# Patient Record
Sex: FEMALE | Race: WHITE | Employment: PART TIME | URBAN - METROPOLITAN AREA
[De-identification: names, ages, dates, MRNs, and addresses within clinical notes are randomized per-mention and may not be internally consistent; named-entity substitution may affect disease eponyms.]

---

## 2019-06-22 ENCOUNTER — HOSPITAL ENCOUNTER (OUTPATIENT)
Age: 19
Discharge: HOME | End: 2019-06-22
Attending: FAMILY MEDICINE
Payer: COMMERCIAL

## 2019-06-22 VITALS
SYSTOLIC BLOOD PRESSURE: 137 MMHG | DIASTOLIC BLOOD PRESSURE: 88 MMHG | HEART RATE: 101 BPM | RESPIRATION RATE: 20 BRPM | WEIGHT: 110 LBS | OXYGEN SATURATION: 98 % | TEMPERATURE: 99 F

## 2019-06-22 DIAGNOSIS — N30.01 ACUTE CYSTITIS WITH HEMATURIA: Primary | ICD-10-CM

## 2019-06-22 PROCEDURE — 99204 OFFICE O/P NEW MOD 45 MIN: CPT

## 2019-06-22 PROCEDURE — 81002 URINALYSIS NONAUTO W/O SCOPE: CPT | Performed by: FAMILY MEDICINE

## 2019-06-22 PROCEDURE — 87088 URINE BACTERIA CULTURE: CPT | Performed by: FAMILY MEDICINE

## 2019-06-22 PROCEDURE — 87186 SC STD MICRODIL/AGAR DIL: CPT | Performed by: FAMILY MEDICINE

## 2019-06-22 PROCEDURE — 87086 URINE CULTURE/COLONY COUNT: CPT | Performed by: FAMILY MEDICINE

## 2019-06-22 PROCEDURE — 81025 URINE PREGNANCY TEST: CPT | Performed by: FAMILY MEDICINE

## 2019-06-22 RX ORDER — NITROFURANTOIN 25; 75 MG/1; MG/1
100 CAPSULE ORAL 2 TIMES DAILY
Qty: 14 CAPSULE | Refills: 0 | Status: SHIPPED | OUTPATIENT
Start: 2019-06-22 | End: 2019-06-29

## 2019-06-22 RX ORDER — MULTIVITAMIN
TABLET ORAL
COMMUNITY

## 2019-06-22 RX ORDER — ACETAMINOPHEN 500 MG
500 TABLET ORAL EVERY 6 HOURS PRN
COMMUNITY

## 2019-06-22 RX ORDER — IBUPROFEN 200 MG
200 TABLET ORAL EVERY 6 HOURS PRN
COMMUNITY

## 2019-06-22 NOTE — ED PROVIDER NOTES
Patient Seen in: [facility]    History   Patient presents with:  Urinary Symptoms (urologic)    Stated Complaint: Urinary Complaints    HPI  57-year-old female coming in with urinary urgency that started yesterday.   Patient stat and oriented to person place and time  GAIT: Normal       ED Course     Labs Reviewed   POCT URINALYSIS DIPSTICK - Abnormal; Notable for the following components:       Result Value    Ketone, Urine Trace (*)     Blood, Urine Large (*)     Protein urine 10 MG Oral Cap  Take 1 capsule (100 mg total) by mouth 2 (two) times daily for 7 days. , Normal, Disp-14 capsule, R-0

## 2019-06-22 NOTE — ED INITIAL ASSESSMENT (HPI)
Pt. With urinary urgency since yesterday. States she has had about 3 UTI problems in the past 6-12 months. Did have normal BM today, no blood. Some lower abdominal pressure.  No fever or back pain

## 2019-06-24 NOTE — ED NOTES
Preliminary urine culture results sent to physician to review. Patient was prescribed Macrobid and awaiting final report.

## 2019-06-25 NOTE — ED NOTES
Final urine culture positive. Bacteria sensitive to prescribed Macrobid. Call placed to pt (or parent). Message left on voice mail thanking pt for using our services. Please call if any questions or concerns. Return phone number provided.

## 2021-01-01 ENCOUNTER — EXTERNAL RECORD (OUTPATIENT)
Dept: HEALTH INFORMATION MANAGEMENT | Facility: OTHER | Age: 21
End: 2021-01-01

## 2021-05-24 LAB
ALBUMIN SERPL-MCNC: 4.7 G/DL
ALP SERPL-CCNC: 74 U/L
ALT SERPL-CCNC: 28 UNITS/L
AST SERPL-CCNC: 28 UNITS/L
BILIRUB SERPL-MCNC: 0.8 MG/DL
BUN SERPL-MCNC: 12 MG/DL
CALCIUM SERPL-MCNC: 9.9 MG/DL
CHLORIDE SERPL-SCNC: 104 MMOL/L
CHOLEST SERPL-MCNC: 231 MG/DL
CO2 SERPL-SCNC: 26 MMOL/L
CREAT SERPL-MCNC: 0.86 MG/DL
GLOBULIN SER-MCNC: 2.3 G/DL
GLUCOSE SERPL-MCNC: 91 MG/DL
HCT VFR BLD CALC: 49 %
HDLC SERPL-MCNC: 78 MG/DL
HGB BLD-MCNC: 16.3 G/DL
LDLC SERPL CALC-MCNC: 132 MG/DL
MCH RBC QN AUTO: 30.2 PG
MCHC RBC AUTO-ENTMCNC: 33.3 G/DL
MCV RBC AUTO: 90.9 FL
PLATELET # BLD: 311 K/MCL
POTASSIUM SERPL-SCNC: 4.1 MMOL/L
PROT SERPL-MCNC: 7 G/DL
RBC # BLD: 5.39 10*6/UL
SODIUM SERPL-SCNC: 139 MMOL/L
TRIGL SERPL-MCNC: 106 MG/DL
TSH SERPL-ACNC: 1.73 MCUNITS/ML
WBC # BLD: 5.3 K/MCL

## 2021-05-25 ENCOUNTER — OFFICE VISIT (OUTPATIENT)
Dept: CARDIOLOGY | Age: 21
End: 2021-05-25

## 2021-05-25 VITALS
DIASTOLIC BLOOD PRESSURE: 106 MMHG | WEIGHT: 116 LBS | BODY MASS INDEX: 20.55 KG/M2 | HEIGHT: 63 IN | HEART RATE: 103 BPM | SYSTOLIC BLOOD PRESSURE: 152 MMHG

## 2021-05-25 DIAGNOSIS — F90.9 ATTENTION DEFICIT HYPERACTIVITY DISORDER (ADHD), UNSPECIFIED ADHD TYPE: Primary | ICD-10-CM

## 2021-05-25 DIAGNOSIS — I10 HYPERTENSION, UNSPECIFIED TYPE: ICD-10-CM

## 2021-05-25 DIAGNOSIS — F41.9 ANXIETY: ICD-10-CM

## 2021-05-25 PROCEDURE — 3077F SYST BP >= 140 MM HG: CPT | Performed by: INTERNAL MEDICINE

## 2021-05-25 PROCEDURE — 93000 ELECTROCARDIOGRAM COMPLETE: CPT | Performed by: INTERNAL MEDICINE

## 2021-05-25 PROCEDURE — 99205 OFFICE O/P NEW HI 60 MIN: CPT | Performed by: INTERNAL MEDICINE

## 2021-05-25 PROCEDURE — 3080F DIAST BP >= 90 MM HG: CPT | Performed by: INTERNAL MEDICINE

## 2021-05-25 RX ORDER — METHYLPHENIDATE HYDROCHLORIDE 27 MG/1
72 TABLET, EXTENDED RELEASE ORAL DAILY
COMMUNITY
End: 2021-08-10

## 2021-05-25 RX ORDER — MULTIVITAMIN
TABLET ORAL
COMMUNITY

## 2021-05-25 RX ORDER — IBUPROFEN 200 MG
200 TABLET ORAL
COMMUNITY

## 2021-05-25 RX ORDER — OMEGA-3 FATTY ACIDS/FISH OIL 300-1000MG
1000 CAPSULE ORAL DAILY
COMMUNITY

## 2021-05-25 RX ORDER — AMLODIPINE BESYLATE 5 MG/1
5 TABLET ORAL DAILY
Qty: 90 TABLET | Refills: 3 | Status: SHIPPED | OUTPATIENT
Start: 2021-05-25 | End: 2021-08-10 | Stop reason: SDUPTHER

## 2021-05-25 RX ORDER — FLUOXETINE HYDROCHLORIDE 60 MG/1
60 TABLET, FILM COATED ORAL; ORAL DAILY
COMMUNITY
Start: 2021-05-07

## 2021-05-25 RX ORDER — ACETAMINOPHEN 500 MG
500 TABLET ORAL
COMMUNITY

## 2021-05-25 SDOH — HEALTH STABILITY: PHYSICAL HEALTH: ON AVERAGE, HOW MANY DAYS PER WEEK DO YOU ENGAGE IN MODERATE TO STRENUOUS EXERCISE (LIKE A BRISK WALK)?: 0 DAYS

## 2021-05-25 SDOH — HEALTH STABILITY: PHYSICAL HEALTH: ON AVERAGE, HOW MANY MINUTES DO YOU ENGAGE IN EXERCISE AT THIS LEVEL?: 0 MIN

## 2021-05-25 ASSESSMENT — PATIENT HEALTH QUESTIONNAIRE - PHQ9
CLINICAL INTERPRETATION OF PHQ9 SCORE: NO FURTHER SCREENING NEEDED
2. FEELING DOWN, DEPRESSED OR HOPELESS: NOT AT ALL
CLINICAL INTERPRETATION OF PHQ2 SCORE: NO FURTHER SCREENING NEEDED
SUM OF ALL RESPONSES TO PHQ9 QUESTIONS 1 AND 2: 0
1. LITTLE INTEREST OR PLEASURE IN DOING THINGS: NOT AT ALL
SUM OF ALL RESPONSES TO PHQ9 QUESTIONS 1 AND 2: 0

## 2021-05-27 ENCOUNTER — TELEPHONE (OUTPATIENT)
Dept: CARDIOLOGY | Age: 21
End: 2021-05-27

## 2021-05-27 DIAGNOSIS — E78.00 ELEVATED CHOLESTEROL: ICD-10-CM

## 2021-05-27 DIAGNOSIS — E78.00 ELEVATED LDL CHOLESTEROL LEVEL: Primary | ICD-10-CM

## 2021-05-27 DIAGNOSIS — E78.2 ELEVATED CHOLESTEROL WITH ELEVATED TRIGLYCERIDES: ICD-10-CM

## 2021-05-28 ENCOUNTER — CLINICAL ABSTRACT (OUTPATIENT)
Dept: CARDIOLOGY | Age: 21
End: 2021-05-28

## 2021-06-30 ENCOUNTER — TELEPHONE (OUTPATIENT)
Dept: CARDIOLOGY | Age: 21
End: 2021-06-30

## 2021-07-01 ENCOUNTER — TELEPHONE (OUTPATIENT)
Dept: CARDIOLOGY | Age: 21
End: 2021-07-01

## 2021-07-01 DIAGNOSIS — E26.9 HYPERALDOSTERONISM (CMD): Primary | ICD-10-CM

## 2021-07-01 DIAGNOSIS — I10 HYPERTENSION, UNSPECIFIED TYPE: ICD-10-CM

## 2021-07-01 DIAGNOSIS — E78.00 ELEVATED CHOLESTEROL: ICD-10-CM

## 2021-08-04 RX ORDER — DEXMETHYLPHENIDATE HYDROCHLORIDE 10 MG/1
10 TABLET ORAL DAILY
COMMUNITY

## 2021-08-10 ENCOUNTER — OFFICE VISIT (OUTPATIENT)
Dept: CARDIOLOGY | Age: 21
End: 2021-08-10

## 2021-08-10 ENCOUNTER — TELEPHONE (OUTPATIENT)
Dept: CARDIOLOGY | Age: 21
End: 2021-08-10

## 2021-08-10 VITALS
HEIGHT: 63 IN | BODY MASS INDEX: 22.15 KG/M2 | HEART RATE: 80 BPM | DIASTOLIC BLOOD PRESSURE: 84 MMHG | SYSTOLIC BLOOD PRESSURE: 122 MMHG | WEIGHT: 125 LBS

## 2021-08-10 DIAGNOSIS — F90.9 ATTENTION DEFICIT HYPERACTIVITY DISORDER (ADHD), UNSPECIFIED ADHD TYPE: Primary | ICD-10-CM

## 2021-08-10 DIAGNOSIS — F41.9 ANXIETY: ICD-10-CM

## 2021-08-10 DIAGNOSIS — F90.9 ATTENTION DEFICIT HYPERACTIVITY DISORDER (ADHD), UNSPECIFIED ADHD TYPE: ICD-10-CM

## 2021-08-10 DIAGNOSIS — I10 HYPERTENSION, UNSPECIFIED TYPE: ICD-10-CM

## 2021-08-10 DIAGNOSIS — E26.9 HYPERALDOSTERONISM (CMD): ICD-10-CM

## 2021-08-10 PROCEDURE — 3074F SYST BP LT 130 MM HG: CPT | Performed by: INTERNAL MEDICINE

## 2021-08-10 PROCEDURE — 3079F DIAST BP 80-89 MM HG: CPT | Performed by: INTERNAL MEDICINE

## 2021-08-10 PROCEDURE — 99215 OFFICE O/P EST HI 40 MIN: CPT | Performed by: INTERNAL MEDICINE

## 2021-08-10 RX ORDER — AMLODIPINE BESYLATE 5 MG/1
5 TABLET ORAL DAILY
Qty: 90 TABLET | Refills: 3 | Status: SHIPPED | OUTPATIENT
Start: 2021-08-10

## 2021-08-10 ASSESSMENT — PATIENT HEALTH QUESTIONNAIRE - PHQ9
SUM OF ALL RESPONSES TO PHQ9 QUESTIONS 1 AND 2: 0
SUM OF ALL RESPONSES TO PHQ9 QUESTIONS 1 AND 2: 0
2. FEELING DOWN, DEPRESSED OR HOPELESS: NOT AT ALL
1. LITTLE INTEREST OR PLEASURE IN DOING THINGS: NOT AT ALL
CLINICAL INTERPRETATION OF PHQ9 SCORE: NO FURTHER SCREENING NEEDED
CLINICAL INTERPRETATION OF PHQ2 SCORE: NO FURTHER SCREENING NEEDED

## 2021-11-09 ENCOUNTER — DOCUMENTATION (OUTPATIENT)
Dept: CARDIOLOGY | Age: 21
End: 2021-11-09

## 2022-01-11 ENCOUNTER — DOCUMENTATION (OUTPATIENT)
Dept: CARDIOLOGY | Age: 22
End: 2022-01-11

## 2022-07-19 ENCOUNTER — OFFICE VISIT (OUTPATIENT)
Dept: INTERNAL MEDICINE CLINIC | Facility: CLINIC | Age: 22
End: 2022-07-19
Payer: COMMERCIAL

## 2022-07-19 VITALS
HEART RATE: 103 BPM | DIASTOLIC BLOOD PRESSURE: 90 MMHG | SYSTOLIC BLOOD PRESSURE: 139 MMHG | BODY MASS INDEX: 23.21 KG/M2 | HEIGHT: 62.98 IN | OXYGEN SATURATION: 98 % | TEMPERATURE: 99 F | WEIGHT: 131 LBS

## 2022-07-19 DIAGNOSIS — R10.2 PELVIC PAIN IN FEMALE: ICD-10-CM

## 2022-07-19 DIAGNOSIS — Z11.3 SCREENING FOR STD (SEXUALLY TRANSMITTED DISEASE): ICD-10-CM

## 2022-07-19 DIAGNOSIS — Z00.00 ROUTINE GENERAL MEDICAL EXAMINATION AT A HEALTH CARE FACILITY: Primary | ICD-10-CM

## 2022-07-19 DIAGNOSIS — Z01.419 ENCOUNTER FOR GYNECOLOGICAL EXAMINATION WITH PAPANICOLAOU SMEAR OF CERVIX: ICD-10-CM

## 2022-07-19 DIAGNOSIS — I10 PRIMARY HYPERTENSION: ICD-10-CM

## 2022-07-19 DIAGNOSIS — Z30.09 ENCOUNTER FOR OTHER GENERAL COUNSELING OR ADVICE ON CONTRACEPTION: ICD-10-CM

## 2022-07-19 PROCEDURE — 3008F BODY MASS INDEX DOCD: CPT | Performed by: FAMILY MEDICINE

## 2022-07-19 PROCEDURE — 99203 OFFICE O/P NEW LOW 30 MIN: CPT | Performed by: FAMILY MEDICINE

## 2022-07-19 PROCEDURE — 99385 PREV VISIT NEW AGE 18-39: CPT | Performed by: FAMILY MEDICINE

## 2022-07-19 PROCEDURE — 3075F SYST BP GE 130 - 139MM HG: CPT | Performed by: FAMILY MEDICINE

## 2022-07-19 PROCEDURE — 3080F DIAST BP >= 90 MM HG: CPT | Performed by: FAMILY MEDICINE

## 2022-07-19 RX ORDER — AMLODIPINE BESYLATE 5 MG/1
5 TABLET ORAL AS DIRECTED
COMMUNITY
Start: 2021-05-25

## 2022-07-26 LAB — HPV I/H RISK 1 DNA SPEC QL NAA+PROBE: NEGATIVE

## 2023-05-22 ENCOUNTER — TELEPHONE (OUTPATIENT)
Dept: INTERNAL MEDICINE CLINIC | Facility: CLINIC | Age: 23
End: 2023-05-22

## 2023-06-01 ENCOUNTER — OFFICE VISIT (OUTPATIENT)
Dept: INTERNAL MEDICINE CLINIC | Facility: CLINIC | Age: 23
End: 2023-06-01
Payer: COMMERCIAL

## 2023-06-01 VITALS
BODY MASS INDEX: 27.11 KG/M2 | DIASTOLIC BLOOD PRESSURE: 70 MMHG | WEIGHT: 153 LBS | HEART RATE: 118 BPM | TEMPERATURE: 100 F | HEIGHT: 62.98 IN | RESPIRATION RATE: 12 BRPM | SYSTOLIC BLOOD PRESSURE: 122 MMHG | OXYGEN SATURATION: 98 %

## 2023-06-01 DIAGNOSIS — J02.9 SORE THROAT: ICD-10-CM

## 2023-06-01 DIAGNOSIS — J06.9 UPPER RESPIRATORY TRACT INFECTION, UNSPECIFIED TYPE: Primary | ICD-10-CM

## 2023-06-01 DIAGNOSIS — T78.40XA ALLERGIC REACTION TO DRUG, INITIAL ENCOUNTER: ICD-10-CM

## 2023-06-01 LAB
CONTROL LINE PRESENT WITH A CLEAR BACKGROUND (YES/NO): YES YES/NO
KIT LOT #: NORMAL NUMERIC
STREP GRP A CUL-SCR: NEGATIVE

## 2023-06-01 PROCEDURE — 87880 STREP A ASSAY W/OPTIC: CPT | Performed by: FAMILY MEDICINE

## 2023-06-01 PROCEDURE — 99213 OFFICE O/P EST LOW 20 MIN: CPT | Performed by: FAMILY MEDICINE

## 2023-06-01 PROCEDURE — 3008F BODY MASS INDEX DOCD: CPT | Performed by: FAMILY MEDICINE

## 2023-06-01 PROCEDURE — 3078F DIAST BP <80 MM HG: CPT | Performed by: FAMILY MEDICINE

## 2023-06-01 PROCEDURE — 3074F SYST BP LT 130 MM HG: CPT | Performed by: FAMILY MEDICINE

## 2023-06-01 RX ORDER — METHYLPREDNISOLONE 4 MG/1
TABLET ORAL
Qty: 1 EACH | Refills: 0 | Status: SHIPPED | OUTPATIENT
Start: 2023-06-01

## 2023-06-01 RX ORDER — GARLIC EXTRACT 500 MG
1 CAPSULE ORAL DAILY
COMMUNITY

## 2023-11-05 ENCOUNTER — OFFICE VISIT (OUTPATIENT)
Dept: FAMILY MEDICINE CLINIC | Facility: CLINIC | Age: 23
End: 2023-11-05
Payer: COMMERCIAL

## 2023-11-05 VITALS
BODY MASS INDEX: 28.34 KG/M2 | WEIGHT: 154 LBS | RESPIRATION RATE: 16 BRPM | HEIGHT: 62 IN | TEMPERATURE: 99 F | OXYGEN SATURATION: 99 % | HEART RATE: 97 BPM

## 2023-11-05 DIAGNOSIS — R50.9 LOW GRADE FEVER: ICD-10-CM

## 2023-11-05 DIAGNOSIS — J02.9 SORE THROAT: Primary | ICD-10-CM

## 2023-11-05 PROCEDURE — 99213 OFFICE O/P EST LOW 20 MIN: CPT | Performed by: NURSE PRACTITIONER

## 2023-11-05 PROCEDURE — 87880 STREP A ASSAY W/OPTIC: CPT | Performed by: NURSE PRACTITIONER

## 2023-11-05 PROCEDURE — 3008F BODY MASS INDEX DOCD: CPT | Performed by: NURSE PRACTITIONER

## 2023-11-05 RX ORDER — FLUOXETINE HYDROCHLORIDE 60 MG/1
1 TABLET, FILM COATED ORAL; ORAL DAILY
COMMUNITY

## 2023-11-05 RX ORDER — AMLODIPINE BESYLATE 5 MG/1
1 TABLET ORAL DAILY
COMMUNITY

## 2024-01-10 NOTE — PROGRESS NOTES
HPI:   Lindy Dumont is a 23 year old female who presents for a complete physical exam. Symptoms: denies discharge, itching, burning or dysuria, periods are regular, flow is 7 days. Patient complains of .   Regular SBE- occ,Sexually active- yes,  Contraception- condoms. STD history- none    The patient complaints of abdominal pain.  Pain is located at RUQ, LUQ. Pain is described as cramping. Severity is off and on. Associated symptoms: constipation. The pain radiates to none.  Has minor lower abdominal pain. Pt concerned she has some  constipation issues going on. Pt states has a BM usually daily but feels like she could always go more. Pain is worsened by eating spicy foods, possibly gluten?. Gets relief of pain with not eating certain foods and having a BM. Possibly IBS?.     Patient also c/o warts on her feet. Pt has been using otc wart removal medication otc and it has helped shrink it a little but they are still there.  Pt would like to see podiatry.    Pt currently has a cold sore that is not going away very fast. Pt has been using otc but it is not helping. Pt looking for a script. Discussed Valtrex with Pt.    Pt has a hx of ADD. Pt was on medication up until she finished grad school in 2021. Pt was on Focalin. Pt went off because she did not feel she needed it and Pt thought it may of been bringing her BP up. Pt was also stressed at that time.   Pt now has noticed an increase in not focusing as well, decrease concentration and would like to go back on that or something else.  Pt does note she does not do well on Vyvanse and Concerta.  Screening:  Immunization History   Administered Date(s) Administered    Covid-19 Vaccine Moderna 100 mcg/0.5 ml 01/29/2021, 02/14/2021, 03/01/2021    DTAP INFANRIX 04/06/1998, 06/08/1998, 07/27/1998, 05/03/1999, 04/25/2003    FLU VAC QIV SPLIT 3 YRS AND OLDER (61313) 11/10/2017    FLUZONE 6 months and older PFS 0.5 ml (47999) 12/17/2018    HEP A,Ped/Adol,(2 Dose) 03/14/2013,  10/07/2013    HEP B 05/12/2021    HPV (Gardasil) 03/14/2013, 06/24/2013, 11/07/2013    Hep B, Unspecified Formulation 01/31/1998, 03/03/1998, 07/27/1998    Hib, Unspecified Formulation 04/06/1998, 06/08/1998, 07/27/1998, 05/03/1999    IPV 04/06/1998, 06/08/1998, 05/03/1999, 04/25/2003    MMR 05/03/1999, 04/25/2003    Meningococcal (Menomune) 02/02/2010, 06/11/2015    Serogroup B Meningococcal 3 Dose 07/19/2017, 12/17/2018    TDAP 02/28/2008, 07/19/2017    TYPHOID 12/18/2019    Tb Intradermal Test 08/24/2001, 08/29/2001    Varicella 05/03/1999, 02/28/2008     Menarche- 11 yr  PAP- 7/19/22  Any abnormal pap smears- none  Pregnancies- 0, Live births- 0  Mammogram-  n/a   Any breast cancer- none, or any gynecological cancer- none, any cancers paternal grandfather?  Labs- 11/5/21  DEXA over 65yr- n/a  Flu shot-  declines  Colon- n/a  Glasses/contacts- none, last exam- 2020  Dental visits- 10/2023    Immunization History   Administered Date(s) Administered    Covid-19 Vaccine Moderna 100 mcg/0.5 ml 01/29/2021, 02/14/2021, 03/01/2021    DTAP INFANRIX 04/06/1998, 06/08/1998, 07/27/1998, 05/03/1999, 04/25/2003    FLU VAC QIV SPLIT 3 YRS AND OLDER (52362) 11/10/2017    FLUZONE 6 months and older PFS 0.5 ml (91586) 12/17/2018    HEP A,Ped/Adol,(2 Dose) 03/14/2013, 10/07/2013    HEP B 05/12/2021    HPV (Gardasil) 03/14/2013, 06/24/2013, 11/07/2013    Hep B, Unspecified Formulation 01/31/1998, 03/03/1998, 07/27/1998    Hib, Unspecified Formulation 04/06/1998, 06/08/1998, 07/27/1998, 05/03/1999    IPV 04/06/1998, 06/08/1998, 05/03/1999, 04/25/2003    MMR 05/03/1999, 04/25/2003    Meningococcal (Menomune) 02/02/2010, 06/11/2015    Serogroup B Meningococcal 3 Dose 07/19/2017, 12/17/2018    TDAP 02/28/2008, 07/19/2017    TYPHOID 12/18/2019    Tb Intradermal Test 08/24/2001, 08/29/2001    Varicella 05/03/1999, 02/28/2008     Wt Readings from Last 6 Encounters:   01/11/24 150 lb 3.2 oz (68.1 kg)   11/05/23 154 lb (69.9 kg)   06/01/23  153 lb (69.4 kg)   07/19/22 131 lb (59.4 kg)   06/22/19 110 lb (49.9 kg)     Body mass index is 27.47 kg/m².     No results found for: \"GLU\", \"GLUCOSE\"  No results found for: \"CHOLEST\"  No results found for: \"HDL\"  No results found for: \"LDL\"  No results found for: \"AST\"  No results found for: \"ALT\"    Current Outpatient Medications   Medication Sig Dispense Refill    NON FORMULARY Take 2 Pieces by mouth daily. Vini gummies      acidophilus-pectin Oral Cap Take 1 capsule by mouth daily.        Past Medical History:   Diagnosis Date    ADD (attention deficit disorder)     Anxiety     Essential hypertension     Urinary tract infection       Past Surgical History:   Procedure Laterality Date    HERNIA SURGERY      REPAIR ING HERNIA,5+Y/O,REDUCIBL        Family History   Problem Relation Age of Onset    Heart Attack Father     High Cholesterol Father     High Cholesterol Mother     High Cholesterol Maternal Grandfather     Anemia Paternal Grandmother     High Cholesterol Paternal Grandmother     Cancer Paternal Grandfather     Heart Attack Paternal Grandfather       Social History:   Social History     Socioeconomic History    Marital status: Single   Tobacco Use    Smoking status: Never    Smokeless tobacco: Never   Vaping Use    Vaping Use: Never used   Substance and Sexual Activity    Alcohol use: Yes    Drug use: Not Currently     Types: Cannabis   Other Topics Concern    Caffeine Concern No    Exercise No    Seat Belt Yes    Special Diet No    Stress Concern Yes    Weight Concern No     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 Children: 0.   Exercise:  3-4 days per week for 30 minutes when possible .  Diet: watches minimally     REVIEW OF SYSTEMS:   GENERAL: feels well otherwise  SKIN: denies any unusual skin lesions,see HPI  EYES:denies blurred vision or double vision  HEENT: denies nasal congestion, sinus pain or ST  LUNGS: denies shortness of breath with exertion  CARDIOVASCULAR: denies chest pain on exertion  GI:  denies abdominal pain,denies heartburn, see HPI  : denies dysuria, vaginal discharge or itching,periods regular   MUSCULOSKELETAL: denies back pain  NEURO: denies headaches, see HPI  PSYCHE: denies depression or anxiety  HEMATOLOGIC: denies hx of anemia  ENDOCRINE: denies thyroid history  ALL/ASTHMA: denies hx of allergy or asthma    EXAM:   /80 (BP Location: Right arm, Patient Position: Sitting, Cuff Size: adult)   Pulse 100   Temp 97.6 °F (36.4 °C) (Temporal)   Resp 16   Ht 5' 2\" (1.575 m)   Wt 150 lb 3.2 oz (68.1 kg)   LMP 01/10/2024 (Approximate)   SpO2 99%   BMI 27.47 kg/m²   Body mass index is 27.47 kg/m².   GENERAL: well developed, well nourished,in no apparent distress  SKIN: cold sore noted on left side of lower lip. No drg noted.  HEENT: atraumatic, normocephalic,ears and throat are clear  EYES:PERRLA, EOMI, normal optic disk,conjunctiva are clear  NECK: supple,no adenopathy,no bruits  CHEST: no chest tenderness  BREAST: no dominant or suspicious mass  LUNGS: clear to auscultation  CARDIO: RRR without murmur  GI: good BS's,no masses, HSM , + pressure lower abdomen  :introitus is normal,scant discharge,cervix is pink,no adnexal masses or tenderness, PAP was done   RECTAL:good rectal tone, stool is OB negative  MUSCULOSKELETAL: back is not tender,FROM of the back  EXTREMITIES: no cyanosis, clubbing or edema  NEURO: Oriented times three,cranial nerves are intact,motor and sensory are grossly intact  PSYCH:  Speech, mood and affect are appropriate    ASSESSMENT AND PLAN:   Lindy Dumont is a 23 year old female who presents for a complete physical exam.. Self breast exam explained. Health maintenance, will check fasting Labs.   Pt referred to [facility] for an IUD.  Pt' s weight is Body mass index is 27.47 kg/m²., recommended low fat diet and aerobic exercise 30 minutes three times weekly.  The patient indicates understanding of these issues and agrees to the plan.  The patient is asked to return  for CPX in one year..  1. Lower abdominal pain  -constipation vs IBS. FODMAP diet given  - if tests negative, can refer to GI  - CELIAC DISEASE SCREEN Reflex [E]; Future  - XR ABDOMEN AP (1 VIEW AP UPRIGHT)  (CPT=74018); Future    2. Routine general medical examination at a health care facility    - CBC With Differential With Platelet; Future  - Comp Metabolic Panel (14); Future  - Lipid Panel; Future  - Hemoglobin A1C; Future  - TSH W Reflex To Free T4; Future  - Vitamin D; Future    3. Attention deficit disorder, unspecified hyperactivity presence  ADHD - treatment of ADHD discussed with patient at length.  Patient is willing to restart stimulant type medication treatment for this condition.  Side effects and risks of medication explained in detail including but not limited to anorexia, weight loss, tremor, and anxiety.  Patient understands and agrees to the above plan.  Pt to f/u in a month if an increase in the dose is needed.  - Dexmethylphenidate HCl ER (FOCALIN XR) 10 MG Oral Capsule SR 24 Hr; Take 1 capsule (10 mg total) by mouth daily.  Dispense: 30 capsule; Refill: 0    4. Plantar wart    - Podiatry Referral - In Network    5. Recurrent cold sores  Use as needed for 3 days in row when she gets a cold sore.  - valACYclovir 500 MG Oral Tab; Take 1 tablet (500 mg total) by mouth 2 (two) times daily. PRN  for cold sores  Dispense: 15 tablet; Refill: 0    Encounter Diagnoses   Name Primary?    Lower abdominal pain Yes    Routine general medical examination at a health care facility     Attention deficit disorder, unspecified hyperactivity presence     Plantar wart     Recurrent cold sores        Orders Placed This Encounter   Procedures    CBC With Differential With Platelet    Comp Metabolic Panel (14)    Lipid Panel    Hemoglobin A1C    TSH W Reflex To Free T4    Vitamin D    CELIAC DISEASE SCREEN Reflex [E]       Meds & Refills for this Visit:  Requested Prescriptions     Signed Prescriptions Disp Refills     Dexmethylphenidate HCl ER (FOCALIN XR) 10 MG Oral Capsule SR 24 Hr 30 capsule 0     Sig: Take 1 capsule (10 mg total) by mouth daily.    valACYclovir 500 MG Oral Tab 15 tablet 0     Sig: Take 1 tablet (500 mg total) by mouth 2 (two) times daily. PRN  for cold sores       Imaging & Consults:  PODIATRY - INTERNAL  XR ABDOMEN AP (1 VIEW AP UPRIGHT)  (CPT=74018)

## 2024-01-11 ENCOUNTER — OFFICE VISIT (OUTPATIENT)
Dept: INTERNAL MEDICINE CLINIC | Facility: CLINIC | Age: 24
End: 2024-01-11
Payer: COMMERCIAL

## 2024-01-11 VITALS
WEIGHT: 150.19 LBS | HEART RATE: 100 BPM | DIASTOLIC BLOOD PRESSURE: 80 MMHG | SYSTOLIC BLOOD PRESSURE: 126 MMHG | TEMPERATURE: 98 F | RESPIRATION RATE: 16 BRPM | HEIGHT: 62 IN | BODY MASS INDEX: 27.64 KG/M2 | OXYGEN SATURATION: 99 %

## 2024-01-11 DIAGNOSIS — B00.1 RECURRENT COLD SORES: ICD-10-CM

## 2024-01-11 DIAGNOSIS — F98.8 ATTENTION DEFICIT DISORDER, UNSPECIFIED HYPERACTIVITY PRESENCE: ICD-10-CM

## 2024-01-11 DIAGNOSIS — R10.30 LOWER ABDOMINAL PAIN: Primary | ICD-10-CM

## 2024-01-11 DIAGNOSIS — Z00.00 ROUTINE GENERAL MEDICAL EXAMINATION AT A HEALTH CARE FACILITY: ICD-10-CM

## 2024-01-11 DIAGNOSIS — B07.0 PLANTAR WART: ICD-10-CM

## 2024-01-11 PROCEDURE — 99213 OFFICE O/P EST LOW 20 MIN: CPT | Performed by: FAMILY MEDICINE

## 2024-01-11 PROCEDURE — 3079F DIAST BP 80-89 MM HG: CPT | Performed by: FAMILY MEDICINE

## 2024-01-11 PROCEDURE — 3008F BODY MASS INDEX DOCD: CPT | Performed by: FAMILY MEDICINE

## 2024-01-11 PROCEDURE — 3074F SYST BP LT 130 MM HG: CPT | Performed by: FAMILY MEDICINE

## 2024-01-11 PROCEDURE — 99395 PREV VISIT EST AGE 18-39: CPT | Performed by: FAMILY MEDICINE

## 2024-01-11 RX ORDER — VALACYCLOVIR HYDROCHLORIDE 500 MG/1
500 TABLET, FILM COATED ORAL 2 TIMES DAILY
Qty: 15 TABLET | Refills: 0 | Status: SHIPPED | OUTPATIENT
Start: 2024-01-11

## 2024-01-11 RX ORDER — DEXMETHYLPHENIDATE HYDROCHLORIDE 10 MG/1
10 CAPSULE, EXTENDED RELEASE ORAL DAILY
Qty: 30 CAPSULE | Refills: 0 | Status: SHIPPED | OUTPATIENT
Start: 2024-01-11

## 2024-01-16 ENCOUNTER — HOSPITAL ENCOUNTER (OUTPATIENT)
Dept: GENERAL RADIOLOGY | Age: 24
Discharge: HOME | End: 2024-01-16
Attending: FAMILY MEDICINE
Payer: COMMERCIAL

## 2024-01-16 ENCOUNTER — LAB ENCOUNTER (OUTPATIENT)
Dept: LAB | Age: 24
End: 2024-01-16
Attending: FAMILY MEDICINE
Payer: COMMERCIAL

## 2024-01-16 DIAGNOSIS — R10.30 LOWER ABDOMINAL PAIN: ICD-10-CM

## 2024-01-16 DIAGNOSIS — E55.9 VITAMIN D DEFICIENCY: Primary | ICD-10-CM

## 2024-01-16 DIAGNOSIS — Z00.00 ROUTINE GENERAL MEDICAL EXAMINATION AT A HEALTH CARE FACILITY: ICD-10-CM

## 2024-01-16 LAB
ALBUMIN SERPL-MCNC: 4.4 G/DL (ref 3.4–5)
ALBUMIN/GLOB SERPL: 1.4 {RATIO} (ref 1–2)
ALP LIVER SERPL-CCNC: 86 U/L
ALT SERPL-CCNC: 31 U/L
ANION GAP SERPL CALC-SCNC: 3 MMOL/L (ref 0–18)
AST SERPL-CCNC: 20 U/L (ref 15–37)
BASOPHILS # BLD AUTO: 0.05 X10(3) UL (ref 0–0.2)
BASOPHILS NFR BLD AUTO: 0.9 %
BILIRUB SERPL-MCNC: 0.5 MG/DL (ref 0.1–2)
BUN BLD-MCNC: 12 MG/DL (ref 9–23)
CALCIUM BLD-MCNC: 9.3 MG/DL (ref 8.5–10.1)
CHLORIDE SERPL-SCNC: 110 MMOL/L (ref 98–112)
CHOLEST SERPL-MCNC: 165 MG/DL (ref ?–200)
CO2 SERPL-SCNC: 28 MMOL/L (ref 21–32)
CREAT BLD-MCNC: 0.72 MG/DL
EGFRCR SERPLBLD CKD-EPI 2021: 119 ML/MIN/1.73M2 (ref 60–?)
EOSINOPHIL # BLD AUTO: 0.11 X10(3) UL (ref 0–0.7)
EOSINOPHIL NFR BLD AUTO: 1.9 %
ERYTHROCYTE [DISTWIDTH] IN BLOOD BY AUTOMATED COUNT: 12.5 %
EST. AVERAGE GLUCOSE BLD GHB EST-MCNC: 108 MG/DL (ref 68–126)
FASTING PATIENT LIPID ANSWER: YES
FASTING STATUS PATIENT QL REPORTED: YES
GLOBULIN PLAS-MCNC: 3.1 G/DL (ref 2.8–4.4)
GLUCOSE BLD-MCNC: 87 MG/DL (ref 70–99)
HBA1C MFR BLD: 5.4 % (ref ?–5.7)
HCT VFR BLD AUTO: 41.5 %
HDLC SERPL-MCNC: 58 MG/DL (ref 40–59)
HGB BLD-MCNC: 14 G/DL
IGA SERPL-MCNC: 182.5 MG/DL (ref 70–312)
IMM GRANULOCYTES # BLD AUTO: 0.01 X10(3) UL (ref 0–1)
IMM GRANULOCYTES NFR BLD: 0.2 %
LDLC SERPL CALC-MCNC: 96 MG/DL (ref ?–100)
LYMPHOCYTES # BLD AUTO: 2.27 X10(3) UL (ref 1–4)
LYMPHOCYTES NFR BLD AUTO: 39.4 %
MCH RBC QN AUTO: 30 PG (ref 26–34)
MCHC RBC AUTO-ENTMCNC: 33.7 G/DL (ref 31–37)
MCV RBC AUTO: 89.1 FL
MONOCYTES # BLD AUTO: 0.41 X10(3) UL (ref 0.1–1)
MONOCYTES NFR BLD AUTO: 7.1 %
NEUTROPHILS # BLD AUTO: 2.91 X10 (3) UL (ref 1.5–7.7)
NEUTROPHILS # BLD AUTO: 2.91 X10(3) UL (ref 1.5–7.7)
NEUTROPHILS NFR BLD AUTO: 50.5 %
NONHDLC SERPL-MCNC: 107 MG/DL (ref ?–130)
OSMOLALITY SERPL CALC.SUM OF ELEC: 291 MOSM/KG (ref 275–295)
PLATELET # BLD AUTO: 279 10(3)UL (ref 150–450)
POTASSIUM SERPL-SCNC: 3.8 MMOL/L (ref 3.5–5.1)
PROT SERPL-MCNC: 7.5 G/DL (ref 6.4–8.2)
RBC # BLD AUTO: 4.66 X10(6)UL
SODIUM SERPL-SCNC: 141 MMOL/L (ref 136–145)
TRIGL SERPL-MCNC: 54 MG/DL (ref 30–149)
TSI SER-ACNC: 2.27 MIU/ML (ref 0.36–3.74)
VIT D+METAB SERPL-MCNC: 20.4 NG/ML (ref 30–100)
VLDLC SERPL CALC-MCNC: 9 MG/DL (ref 0–30)
WBC # BLD AUTO: 5.8 X10(3) UL (ref 4–11)

## 2024-01-16 PROCEDURE — 82306 VITAMIN D 25 HYDROXY: CPT

## 2024-01-16 PROCEDURE — 80061 LIPID PANEL: CPT

## 2024-01-16 PROCEDURE — 82784 ASSAY IGA/IGD/IGG/IGM EACH: CPT

## 2024-01-16 PROCEDURE — 85025 COMPLETE CBC W/AUTO DIFF WBC: CPT

## 2024-01-16 PROCEDURE — 86364 TISS TRNSGLTMNASE EA IG CLAS: CPT

## 2024-01-16 PROCEDURE — 80053 COMPREHEN METABOLIC PANEL: CPT

## 2024-01-16 PROCEDURE — 74018 RADEX ABDOMEN 1 VIEW: CPT | Performed by: FAMILY MEDICINE

## 2024-01-16 PROCEDURE — 83036 HEMOGLOBIN GLYCOSYLATED A1C: CPT

## 2024-01-16 PROCEDURE — 84443 ASSAY THYROID STIM HORMONE: CPT

## 2024-01-16 RX ORDER — ERGOCALCIFEROL 1.25 MG/1
50000 CAPSULE ORAL WEEKLY
Qty: 12 CAPSULE | Refills: 1 | Status: SHIPPED | OUTPATIENT
Start: 2024-01-16

## 2024-01-17 LAB — TTG IGA SER-ACNC: 0.6 U/ML (ref ?–7)

## 2024-01-25 ENCOUNTER — OFFICE VISIT (OUTPATIENT)
Dept: PODIATRY CLINIC | Facility: CLINIC | Age: 24
End: 2024-01-25
Payer: COMMERCIAL

## 2024-01-25 DIAGNOSIS — B07.0 PLANTAR WART, RIGHT FOOT: Primary | ICD-10-CM

## 2024-01-25 DIAGNOSIS — B07.0 PLANTAR WART OF LEFT FOOT: ICD-10-CM

## 2024-01-25 NOTE — PROGRESS NOTES
[facility] Podiatry  Progress Note    Lindy Dumont is a 23 year old female.   Chief Complaint   Patient presents with    Warts     Bilateral feet- cluster on the right foot- left foot has 2 -3 warts- painful when walking- patient denies pain          HPI:        Patient is a pleasant 23-year-old female presents to clinic for evaluation of warts to right and left feet that have been present for over a year.  She has tried some topical medicine that has not provided much relief.  She is here to discuss what treatment options are available.  No other complaints are mentioned.  Past medical history, medications, and allergies reviewed.    Allergies: Amoxil [amoxicillin]   Current Outpatient Medications   Medication Sig Dispense Refill    ergocalciferol 1.25 MG (14371 UT) Oral Cap Take 1 capsule (50,000 Units total) by mouth once a week. 12 capsule 1    NON FORMULARY Take 2 Pieces by mouth daily. Vini gummies      Dexmethylphenidate HCl ER (FOCALIN XR) 10 MG Oral Capsule SR 24 Hr Take 1 capsule (10 mg total) by mouth daily. 30 capsule 0    acidophilus-pectin Oral Cap Take 1 capsule by mouth daily.      valACYclovir 500 MG Oral Tab Take 1 tablet (500 mg total) by mouth 2 (two) times daily. PRN  for cold sores 15 tablet 0      Past Medical History:   Diagnosis Date    ADD (attention deficit disorder)     Anxiety     Essential hypertension     Urinary tract infection       Past Surgical History:   Procedure Laterality Date    HERNIA SURGERY      REPAIR ING HERNIA,5+Y/O,REDUCIBL        Family History   Problem Relation Age of Onset    Heart Attack Father     High Cholesterol Father     High Cholesterol Mother     High Cholesterol Maternal Grandfather     Anemia Paternal Grandmother     High Cholesterol Paternal Grandmother     Cancer Paternal Grandfather     Heart Attack Paternal Grandfather       Social History     Socioeconomic History    Marital status: Single   Tobacco Use    Smoking status: Never    Smokeless  tobacco: Never   Vaping Use    Vaping Use: Never used   Substance and Sexual Activity    Alcohol use: Yes    Drug use: Not Currently     Types: Cannabis   Other Topics Concern    Caffeine Concern No    Exercise No    Seat Belt Yes    Special Diet No    Stress Concern Yes    Weight Concern No           REVIEW OF SYSTEMS:     Today reviewed systems as documented below  GENERAL HEALTH: feels well otherwise  SKIN: denies any unusual skin lesions or rashes  RESPIRATORY: denies shortness of breath with exertion  CARDIOVASCULAR: denies chest pain on exertion  GI: denies abdominal pain and denies heartburn  NEURO: denies headaches  MUSCULO: denies arthritis, back pain      EXAM:   LMP 01/10/2024 (Approximate)   GENERAL: well developed, well nourished, in no apparent distress  EXTREMITIES:             1. Integument: Normal skin temperature and turgor.  Cluster of plantar warts to the plantar lateral heel of right foot.  Cluster also noted to medial and lateral heel of left foot.  See images above.  2. Vascular: Dorsalis pedis two out of four bilateral and posterior tibial pulses two out of   four bilateral, capillary refill normal.   3. Musculoskeletal: All muscle groups are graded 5 out of 5 in the foot and ankle.   4. Neurological: Normal sharp dull sensation; reflexes normal.        ASSESSMENT AND PLAN:   Diagnoses and all orders for this visit:    Plantar wart, right foot    Plantar wart of left foot        Plan:    -Patient examined, chart history reviewed.  -Discussed etiology of condition and various treatment options.  -Patient's condition consistent with plantar wart to right and left feet.  Pared sites until pinpoint bleeding was noted.  -Discussed with patient that we usually would apply cantharidin but our office is unfortunately out of this medication as it is on backorder.  Instead we will have patient apply Compound W for the next week until new cantharidin is received.  Will make sure patient is not charged  for visit as we are out of cantharidin at this time.  -Today sites were cauterized with silver nitrate and dressed with bacitracin and Band-Aid.  -Will follow-up in 1 week for cantharidin application.    The patient indicates understanding of these issues and agrees to the plan.        JOCELYN Corral speech recognition software was used to prepare this note.  Errors in word recognition may occur.  Please contact me with any questions/concerns with this note.

## 2024-09-01 NOTE — PROGRESS NOTES
HPI:   Lindy Dumont is a 24 year old female who presents for a complete physical exam. Symptoms: denies discharge, itching, burning or dysuria, periods are regular, flow is 5 days.  Menses heavy the first few days and then it lightens up.  Regular SBE- occ,Sexually active- yes,  Contraception- condoms. STD history- none    Pt complains of symptoms of anxiety.   Has been having these sx's for years on and off. Pt use to be on Zoloft and Prozac when younger.   Sx's are described as feeling anxious, overwhelmed, panic.   Patient's sx's are worsened by being at her job.   Symptoms are improved by not being at her job, exercise and counseling. Pt has been in counseling since 2020.   Pt has  been treated for this in the past.  Appetite: fair, when anxious she can not eat. Pt feels nauseated, occ vomits  Sleep:can go to sleep ok, but is wakes up, hard to go back to sleep.  Mood: ok  Denies suicide ideation and plan.    Screening:  Immunization History   Administered Date(s) Administered    Covid-19 Vaccine Moderna 100 mcg/0.5 ml 01/29/2021, 02/14/2021, 03/01/2021    Covid-19 Vaccine Moderna 50 Mcg/0.25 Ml 11/21/2021    DTAP 04/06/1998, 06/08/1998, 07/27/1998, 05/03/1999, 04/25/2003    FLU VAC QIV SPLIT 3 YRS AND OLDER (65083) 11/10/2017    FLUZONE 6 months and older PFS 0.5 ml (35022) 12/17/2018, 11/21/2021    HEP A,Ped/Adol,(2 Dose) 03/14/2013, 10/07/2013    HEP B 05/12/2021    HPV (Gardasil) 03/14/2013, 06/24/2013, 11/07/2013    Hep B, Unspecified Formulation 01/31/1998, 03/03/1998, 07/27/1998    Hib, Unspecified Formulation 04/06/1998, 06/08/1998, 07/27/1998, 05/03/1999    IPV 04/06/1998, 06/08/1998, 05/03/1999, 04/25/2003    MMR 05/03/1999, 04/25/2003    Meningococcal (Menomune) 02/02/2010, 06/11/2015    Serogroup B Meningococcal 3 Dose 07/19/2017, 12/17/2018    TDAP 02/28/2008, 07/19/2017    TYPHOID 12/18/2019    Tb Intradermal Test 08/24/2001, 08/29/2001    Varicella 05/03/1999, 02/28/2008      Menarche- 11  yr  PAP- 7/19/22  Any abnormal pap smears- none  Pregnancies- 0, Live births- 0  Mammogram-  n/a   Any breast cancer- none, or any gynecological cancer- none, any cancers none  Labs- 1/16/24. Pt needs a repeat vit D.  DEXA over 65yr- n/a  Flu shot-  none  Colon- n/a  Glasses/contacts- none, last exam- 2019  Dental visits- 11 /2024    Immunization History   Administered Date(s) Administered    Covid-19 Vaccine Moderna 100 mcg/0.5 ml 01/29/2021, 02/14/2021, 03/01/2021    Covid-19 Vaccine Moderna 50 Mcg/0.25 Ml 11/21/2021    DTAP 04/06/1998, 06/08/1998, 07/27/1998, 05/03/1999, 04/25/2003    FLU VAC QIV SPLIT 3 YRS AND OLDER (92652) 11/10/2017    FLUZONE 6 months and older PFS 0.5 ml (51512) 12/17/2018, 11/21/2021    HEP A,Ped/Adol,(2 Dose) 03/14/2013, 10/07/2013    HEP B 05/12/2021    HPV (Gardasil) 03/14/2013, 06/24/2013, 11/07/2013    Hep B, Unspecified Formulation 01/31/1998, 03/03/1998, 07/27/1998    Hib, Unspecified Formulation 04/06/1998, 06/08/1998, 07/27/1998, 05/03/1999    IPV 04/06/1998, 06/08/1998, 05/03/1999, 04/25/2003    MMR 05/03/1999, 04/25/2003    Meningococcal (Menomune) 02/02/2010, 06/11/2015    Serogroup B Meningococcal 3 Dose 07/19/2017, 12/17/2018    TDAP 02/28/2008, 07/19/2017    TYPHOID 12/18/2019    Tb Intradermal Test 08/24/2001, 08/29/2001    Varicella 05/03/1999, 02/28/2008     Wt Readings from Last 6 Encounters:   09/03/24 134 lb (60.8 kg)   01/11/24 150 lb 3.2 oz (68.1 kg)   11/05/23 154 lb (69.9 kg)   06/01/23 153 lb (69.4 kg)   07/19/22 131 lb (59.4 kg)   06/22/19 110 lb (49.9 kg)     Body mass index is 24.51 kg/m².     Lab Results   Component Value Date    GLU 87 01/16/2024     Lab Results   Component Value Date    CHOLEST 165 01/16/2024     Lab Results   Component Value Date    HDL 58 01/16/2024     Lab Results   Component Value Date    LDL 96 01/16/2024     Lab Results   Component Value Date    AST 20 01/16/2024     Lab Results   Component Value Date    ALT 31 01/16/2024       Current  Outpatient Medications   Medication Sig Dispense Refill    NON FORMULARY Take 2 each by mouth daily. Immune booster gummies      NON FORMULARY Take 2 Pieces by mouth daily. Vini gummies      acidophilus-pectin Oral Cap Take 1 capsule by mouth daily.        Past Medical History:    ADD (attention deficit disorder)    Anxiety    Essential hypertension    Urinary tract infection      Past Surgical History:   Procedure Laterality Date    Hernia surgery      Repair ing hernia,5+y/o,reducibl        Family History   Problem Relation Age of Onset    Heart Attack Father     High Cholesterol Father     High Cholesterol Mother     High Cholesterol Maternal Grandfather     Anemia Paternal Grandmother     High Cholesterol Paternal Grandmother     Cancer Paternal Grandfather     Heart Attack Paternal Grandfather       Social History:   Social History     Socioeconomic History    Marital status: Single   Tobacco Use    Smoking status: Never    Smokeless tobacco: Never   Vaping Use    Vaping status: Never Used   Substance and Sexual Activity    Alcohol use: Yes    Drug use: Not Currently     Types: Cannabis    Sexual activity: Yes   Other Topics Concern    Caffeine Concern No    Exercise No    Seat Belt Yes    Special Diet No    Stress Concern Yes    Weight Concern No     Social Determinants of Health     Physical Activity: Inactive (5/25/2021)    Received from [facility] [employer]    Exercise Vital Sign     Days of Exercise per Week: 0 days     Minutes of Exercise per Session: 0 min     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 Children: 0.   Exercise:  3-5 times per week.  Diet: watches minimally     REVIEW OF SYSTEMS:   GENERAL: feels well otherwise  SKIN: denies any unusual skin lesions  EYES:denies blurred vision or double vision  HEENT: denies nasal congestion, sinus pain or ST  LUNGS: denies shortness of breath with exertion  CARDIOVASCULAR: denies chest pain on exertion  GI: denies abdominal pain,denies  heartburn  : denies dysuria, vaginal discharge or itching,periods regular   MUSCULOSKELETAL: denies back pain  NEURO: denies headaches  PSYCHE: +ADHD,see HPI  HEMATOLOGIC: denies hx of anemia  ENDOCRINE: denies thyroid history  ALL/ASTHMA: denies hx of allergy or asthma    EXAM:   /80 (BP Location: Left arm, Patient Position: Sitting, Cuff Size: adult)   Pulse 84   Temp 97.7 °F (36.5 °C) (Temporal)   Resp 12   Ht 5' 2\" (1.575 m)   Wt 134 lb (60.8 kg)   LMP 08/06/2024 (Approximate)   SpO2 98%   BMI 24.51 kg/m²   Body mass index is 24.51 kg/m².   GENERAL: well developed, well nourished,in no apparent distress  SKIN: no rashes,no suspicious lesions  HEENT: atraumatic, normocephalic,ears and throat are clear  EYES:PERRLA, EOMI, normal optic disk,conjunctiva are clear  NECK: supple,no adenopathy,no bruits  CHEST: no chest tenderness  BREAST: no dominant or suspicious mass  LUNGS: clear to auscultation  CARDIO: RRR without murmur  GI: good BS's,no masses, HSM or tenderness  :I deferred  MUSCULOSKELETAL: back is not tender,FROM of the back  EXTREMITIES: no cyanosis, clubbing or edema  NEURO: Oriented times three,cranial nerves are intact,motor and sensory are grossly intact    ASSESSMENT AND PLAN:   Lindy Dumont is a 24 year old female who presents for a complete physical exam.   Health maintenance, will check fasting Labs. Pt' s weight is Body mass index is 24.51 kg/m²., recommended low fat diet and aerobic exercise 30 minutes three times weekly.  The patient indicates understanding of these issues and agrees to the plan.  The patient is asked to return for CPX in one year.  1. Routine general medical examination at a health care facility    - CBC With Differential With Platelet; Future  - Comp Metabolic Panel (14); Future  - Lipid Panel; Future  - Hemoglobin A1C; Future  - TSH W Reflex To Free T4; Future  - Vitamin D; Future    2. Anxiety  Anxiety  - no suicidal or homicidal thoughts.  Long discussion  about pathophysiology of anxiety.  Treatment options discussed.  Patient opted for treatment with medications.  Risks and benefits of medication(s) discussed in detail, including possible worsening of symptoms and risk of suicidal thoughts. If severe alterations in mentation occur patient instructed to stop medication and call office immediately.  Medication check in 4-6 weeks,sooner if needed.  At this time patient defers referral to psychology / psychiatry.  Medication: Pristiq    - desvenlafaxine ER 25 MG Oral Tablet 24 Hr; Take 1 tablet (25 mg total) by mouth daily.  Dispense: 30 tablet; Refill: 1    Encounter Diagnoses   Name Primary?    Routine general medical examination at a health care facility Yes    Anxiety        Orders Placed This Encounter   Procedures    CBC With Differential With Platelet    Comp Metabolic Panel (14)    Lipid Panel    Hemoglobin A1C    TSH W Reflex To Free T4    Vitamin D       Meds & Refills for this Visit:  Requested Prescriptions     Signed Prescriptions Disp Refills    desvenlafaxine ER 25 MG Oral Tablet 24 Hr 30 tablet 1     Sig: Take 1 tablet (25 mg total) by mouth daily.       Imaging & Consults:  None

## 2024-09-03 ENCOUNTER — OFFICE VISIT (OUTPATIENT)
Dept: INTERNAL MEDICINE CLINIC | Facility: CLINIC | Age: 24
End: 2024-09-03
Payer: COMMERCIAL

## 2024-09-03 VITALS
DIASTOLIC BLOOD PRESSURE: 80 MMHG | OXYGEN SATURATION: 98 % | WEIGHT: 134 LBS | HEART RATE: 84 BPM | BODY MASS INDEX: 24.66 KG/M2 | SYSTOLIC BLOOD PRESSURE: 132 MMHG | RESPIRATION RATE: 12 BRPM | HEIGHT: 62 IN | TEMPERATURE: 98 F

## 2024-09-03 DIAGNOSIS — F41.9 ANXIETY: ICD-10-CM

## 2024-09-03 DIAGNOSIS — Z00.00 ROUTINE GENERAL MEDICAL EXAMINATION AT A HEALTH CARE FACILITY: Primary | ICD-10-CM

## 2024-09-03 PROCEDURE — 3079F DIAST BP 80-89 MM HG: CPT | Performed by: FAMILY MEDICINE

## 2024-09-03 PROCEDURE — 3008F BODY MASS INDEX DOCD: CPT | Performed by: FAMILY MEDICINE

## 2024-09-03 PROCEDURE — 99213 OFFICE O/P EST LOW 20 MIN: CPT | Performed by: FAMILY MEDICINE

## 2024-09-03 PROCEDURE — 3075F SYST BP GE 130 - 139MM HG: CPT | Performed by: FAMILY MEDICINE

## 2024-09-03 PROCEDURE — 99395 PREV VISIT EST AGE 18-39: CPT | Performed by: FAMILY MEDICINE

## 2024-09-03 RX ORDER — DESVENLAFAXINE 25 MG/1
25 TABLET, EXTENDED RELEASE ORAL DAILY
Qty: 30 TABLET | Refills: 1 | Status: SHIPPED | OUTPATIENT
Start: 2024-09-03 | End: 2024-10-03

## 2024-12-02 ENCOUNTER — PATIENT MESSAGE (OUTPATIENT)
Dept: INTERNAL MEDICINE CLINIC | Facility: CLINIC | Age: 24
End: 2024-12-02

## 2024-12-02 DIAGNOSIS — F41.9 ANXIETY: ICD-10-CM

## 2024-12-03 RX ORDER — DESVENLAFAXINE 25 MG/1
25 TABLET, EXTENDED RELEASE ORAL DAILY
Qty: 30 TABLET | Refills: 0
Start: 2024-12-03

## 2024-12-03 RX ORDER — DESVENLAFAXINE 25 MG/1
25 TABLET, EXTENDED RELEASE ORAL DAILY
COMMUNITY
Start: 2024-10-18

## 2024-12-03 NOTE — TELEPHONE ENCOUNTER
Desvenlafaxine ER 25 mg  Filled 10-18-24  Qty 30  0 refills  No future appointments.  LOV 9-3-24 SM

## 2024-12-21 RX ORDER — DESVENLAFAXINE 25 MG/1
25 TABLET, EXTENDED RELEASE ORAL DAILY
Qty: 90 TABLET | Refills: 0 | Status: SHIPPED | OUTPATIENT
Start: 2024-12-21

## 2025-02-09 DIAGNOSIS — F41.9 ANXIETY: ICD-10-CM

## 2025-02-10 RX ORDER — DESVENLAFAXINE 25 MG/1
25 TABLET, EXTENDED RELEASE ORAL DAILY
Qty: 90 TABLET | Refills: 0 | Status: SHIPPED | OUTPATIENT
Start: 2025-02-10

## 2025-02-10 NOTE — TELEPHONE ENCOUNTER
Desvenlafaxine ER 25 mg  Filled 12-21-24  Qty 90  0 refills  No future appointments.  LOV 9-3-24 SM

## 2025-06-10 ENCOUNTER — OFFICE VISIT (OUTPATIENT)
Dept: INTERNAL MEDICINE CLINIC | Facility: CLINIC | Age: 25
End: 2025-06-10
Payer: COMMERCIAL

## 2025-06-10 VITALS
DIASTOLIC BLOOD PRESSURE: 76 MMHG | SYSTOLIC BLOOD PRESSURE: 118 MMHG | BODY MASS INDEX: 24.78 KG/M2 | OXYGEN SATURATION: 97 % | RESPIRATION RATE: 14 BRPM | WEIGHT: 134.63 LBS | HEIGHT: 62 IN | TEMPERATURE: 98 F | HEART RATE: 126 BPM

## 2025-06-10 DIAGNOSIS — F41.9 ANXIETY: ICD-10-CM

## 2025-06-10 DIAGNOSIS — J02.9 PHARYNGITIS, UNSPECIFIED ETIOLOGY: ICD-10-CM

## 2025-06-10 DIAGNOSIS — M79.9 POSTURAL STRAIN: ICD-10-CM

## 2025-06-10 DIAGNOSIS — M54.59 POSTURAL LOW BACK PAIN: ICD-10-CM

## 2025-06-10 DIAGNOSIS — J03.90 TONSILLITIS: Primary | ICD-10-CM

## 2025-06-10 PROCEDURE — 3008F BODY MASS INDEX DOCD: CPT | Performed by: FAMILY MEDICINE

## 2025-06-10 PROCEDURE — 87880 STREP A ASSAY W/OPTIC: CPT | Performed by: FAMILY MEDICINE

## 2025-06-10 PROCEDURE — 3078F DIAST BP <80 MM HG: CPT | Performed by: FAMILY MEDICINE

## 2025-06-10 PROCEDURE — 99214 OFFICE O/P EST MOD 30 MIN: CPT | Performed by: FAMILY MEDICINE

## 2025-06-10 PROCEDURE — 3074F SYST BP LT 130 MM HG: CPT | Performed by: FAMILY MEDICINE

## 2025-06-10 RX ORDER — DESVENLAFAXINE 25 MG/1
25 TABLET, EXTENDED RELEASE ORAL DAILY
Qty: 90 TABLET | Refills: 1 | Status: SHIPPED | OUTPATIENT
Start: 2025-06-10

## 2025-06-10 RX ORDER — AZITHROMYCIN 250 MG/1
TABLET, FILM COATED ORAL
Qty: 6 TABLET | Refills: 0 | Status: SHIPPED | OUTPATIENT
Start: 2025-06-10 | End: 2025-06-15

## 2025-06-10 NOTE — PROGRESS NOTES
CHIEF COMPLAINT:     Chief Complaint   Patient presents with    Medication Follow-Up    Tonsillitis     Started yesterday afternoon. Swelling and painful tonsils, and painful when swallowing, redness. Gargled with salt water.        HPI:   Lindy Dumont is a 25 year old female who presents for throat /cold symptoms since May 24 th  But then the sore thorat worsened yesterday.Patient reports sore throat, congestion, occ cough, PND,right ear pain, denies fever. Symptoms have been not improving since onset.  Treating symptoms with vit C and Ibuprofen.     Pt is here for a recheck of anxiety. Has been tolerating the med- Pristiq  well. Denies any side effects from the meds. Mood has been good. Patient's appetite is good.Pt denies headaches,tics,or insomnia.No depressive symptoms or suicidal ideations. Would like to continue the same medication.    Patient has been having some back pain due to the way she sleeps- in a fetal position and her posture while she sits. Pt states she slouches a lot. Pt would like to do some PT for her posture and back pain.    Current Medications[1]   Past Medical History[2]   Past Surgical History[3]      Short Social Hx on File[4]      REVIEW OF SYSTEMS:   GENERAL: feels well otherwise,  fair appetite  SKIN: no rashes or abnormal skin lesions  HEENT: See HPI  LUNGS: denies shortness of breath or wheezing, See HPI  CARDIOVASCULAR: denies chest pain or palpitations   GI: denies N/V/C or abdominal pain  NEURO: Denies headaches  MUSC: see HPI    EXAM:   /76 (BP Location: Left arm, Patient Position: Sitting, Cuff Size: adult)   Pulse (!) 126   Temp 98.2 °F (36.8 °C) (Temporal)   Resp 14   Ht 5' 2\" (1.575 m)   Wt 134 lb 9.6 oz (61.1 kg)   LMP 08/06/2024 (Approximate)   SpO2 97%   BMI 24.62 kg/m²   GENERAL: well developed, well nourished,in no apparent distress  SKIN: no rashes,no suspicious lesions  HEAD: atraumatic, normocephalic.  no tenderness on palpation of maxillary or frontal  sinuses  EYES: conjunctiva clear, EOM intact  EARS: TM's dull, no bulging, no retraction  NOSE: Nostrils patent, minimal clear nasal discharge, nasal mucosa deep pink  THROAT: Oral mucosa pink, moist. Posterior pharynx is + erythematous. no exudates. Tonsils right is larger  and redden in comparison to the left tonsil.    NECK: Supple, non-tender  LUNGS: clear to auscultation bilaterally, no wheezes or rhonchi. Breathing is non labored.  CARDIO: RRR without murmur  LYMPH:  right cervical lymphadenopathy.        ASSESSMENT AND PLAN:   Lindy Dumont is a 25 year old female who presents with   Encounter Diagnoses   Name Primary?    Anxiety     Pharyngitis, unspecified etiology     Postural low back pain     Postural strain     Tonsillitis Yes       Orders Placed This Encounter   Procedures    Rapid Strep       Meds & Refills for this Visit:  Requested Prescriptions     Signed Prescriptions Disp Refills    desvenlafaxine ER 25 MG Oral Tablet 24 Hr 90 tablet 1     Sig: Take 1 tablet (25 mg total) by mouth daily.    azithromycin (ZITHROMAX Z-CLEMENTINA) 250 MG Oral Tab 6 tablet 0     Sig: Take 2 tablets (500 mg total) by mouth daily for 1 day, THEN 1 tablet (250 mg total) daily for 4 days.       Imaging & Consults:  None    1. Anxiety  - stable, continue medication  - desvenlafaxine ER 25 MG Oral Tablet 24 Hr; Take 1 tablet (25 mg total) by mouth daily.  Dispense: 90 tablet; Refill: 1    2. Pharyngitis, unspecified etiology  Rest, fluids, salt water gargles  - Rapid Strep    3. Postural low back pain  4. Postural strain  - Pt referred to PT. Written script given. Pt is not sure who she wants to use.  5. Tonsillitis  Fluids, rest, warm salt water gargles, chloraseptic spray  Start the antibiotics and take with food.  - Rapid Strep  - azithromycin (ZITHROMAX Z-CLEMENTINA) 250 MG Oral Tab; Take 2 tablets (500 mg total) by mouth daily for 1 day, THEN 1 tablet (250 mg total) daily for 4 days.  Dispense: 6 tablet; Refill: 0    The patient  indicates understanding of these issues and agrees to the plan.  The patient is asked to return if sx's persist or worsen.      I spent 30 minutes preparing to see the patient,reviewing patient's chart,results,history,medical decision making,placing orders,counseling/coordinating care and documenting in the chart.           [1]   Current Outpatient Medications   Medication Sig Dispense Refill    desvenlafaxine ER 25 MG Oral Tablet 24 Hr Take 1 tablet (25 mg total) by mouth daily. APPT DUE MARCH 90 tablet 0    valACYclovir 500 MG Oral Tab Take 1 tablet (500 mg total) by mouth 2 (two) times daily. PRN  for cold sores 15 tablet 1    NON FORMULARY Take 2 each by mouth daily. Immune booster gummies      NON FORMULARY Take 2 Pieces by mouth daily. Vini gummies      acidophilus-pectin Oral Cap Take 1 capsule by mouth daily.     [2]   Past Medical History:   ADD (attention deficit disorder)    Anxiety    Essential hypertension    Urinary tract infection   [3]   Past Surgical History:  Procedure Laterality Date    Hernia surgery      Repair ing hernia,3+y/o,reducibl     [4]   Social History  Socioeconomic History    Marital status: Single   Tobacco Use    Smoking status: Never    Smokeless tobacco: Never   Vaping Use    Vaping status: Never Used   Substance and Sexual Activity    Alcohol use: Yes    Drug use: Not Currently     Types: Cannabis    Sexual activity: Yes   Other Topics Concern    Caffeine Concern No    Exercise No    Seat Belt Yes    Special Diet No    Stress Concern Yes    Weight Concern No